# Patient Record
Sex: FEMALE | Race: WHITE | NOT HISPANIC OR LATINO
[De-identification: names, ages, dates, MRNs, and addresses within clinical notes are randomized per-mention and may not be internally consistent; named-entity substitution may affect disease eponyms.]

---

## 2022-10-27 PROBLEM — Z00.00 ENCOUNTER FOR PREVENTIVE HEALTH EXAMINATION: Status: ACTIVE | Noted: 2022-10-27

## 2022-10-31 ENCOUNTER — APPOINTMENT (OUTPATIENT)
Dept: CARDIOLOGY | Facility: CLINIC | Age: 22
End: 2022-10-31

## 2022-10-31 ENCOUNTER — NON-APPOINTMENT (OUTPATIENT)
Age: 22
End: 2022-10-31

## 2022-10-31 VITALS
SYSTOLIC BLOOD PRESSURE: 105 MMHG | BODY MASS INDEX: 18.95 KG/M2 | WEIGHT: 111 LBS | HEIGHT: 64 IN | DIASTOLIC BLOOD PRESSURE: 70 MMHG

## 2022-10-31 VITALS — OXYGEN SATURATION: 98 % | HEART RATE: 93 BPM

## 2022-10-31 DIAGNOSIS — R11.0 NAUSEA: ICD-10-CM

## 2022-10-31 DIAGNOSIS — R42 DIZZINESS AND GIDDINESS: ICD-10-CM

## 2022-10-31 DIAGNOSIS — R00.2 PALPITATIONS: ICD-10-CM

## 2022-10-31 PROCEDURE — 99204 OFFICE O/P NEW MOD 45 MIN: CPT | Mod: 25

## 2022-10-31 PROCEDURE — 93015 CV STRESS TEST SUPVJ I&R: CPT

## 2022-10-31 NOTE — REASON FOR VISIT
[FreeTextEntry1] : The patient comes for initial office evaluation.  She states her symptoms ordered at around the time that she contracted COVID in early 2021.  Was originally felt to have a long COVID symptom and emerged with symptoms of persistent nausea.  Ultimately a diagnosis of GERD was made she was treated with Flagyl.\par The Giardia resolved however the intermittent nausea has persisted.\par The patient also over the past year has experienced symptoms of tachycardia and significant lightheadedness especially on standing.  She was diagnosed as having POTS by a neurologist.  No specific treatment was recommended except for maintaining adequate hydration at [employer].  Patient states that she often feels woozy when she is exercising.

## 2022-10-31 NOTE — DISCUSSION/SUMMARY
[FreeTextEntry1] : The patient describes symptoms of occasional lightheadedness with exercise I decided to do a treadmill stress to assess her response to exercise.  The patient exercised well into Walt stage IV with no symptoms.  There was no evidence of exercise-induced myocardial ischemia or arrhythmias.  There was an appropriate rise in heart rate with exercise the resting heart rate tends to be relatively elevated.  The cardiorespiratory examination was normal the electrocardiogram is normal.  I was unable to demonstrate any significant cardiac pathology today.  I do think the patient has 2 syndromes 1 of which is a form of POTS syndrome characterized by tachycardia and orthostatic changes.  This has been relatively stable.  The patient has never had a syncopal episode.  She also suffers from a chronic GI disturbance consisting of persistent nausea this has been evaluated by GI specialist and an etiology has not been determined.  I felt the patient would benefit from regular exercise maintaining good hydration and perhaps from relaxation techniques.  Believe stress may be a contributory factor in of her  symptoms.\par Illogical interventions at this time.